# Patient Record
Sex: FEMALE | Race: WHITE | Employment: FULL TIME | URBAN - METROPOLITAN AREA
[De-identification: names, ages, dates, MRNs, and addresses within clinical notes are randomized per-mention and may not be internally consistent; named-entity substitution may affect disease eponyms.]

---

## 2019-07-11 PROCEDURE — 93010 ELECTROCARDIOGRAM REPORT: CPT | Performed by: INTERNAL MEDICINE

## 2019-07-21 ENCOUNTER — HOSPITAL ENCOUNTER (EMERGENCY)
Facility: HOSPITAL | Age: 29
Discharge: HOME | End: 2019-07-21
Payer: COMMERCIAL

## 2019-07-21 VITALS
BODY MASS INDEX: 21.69 KG/M2 | WEIGHT: 135 LBS | RESPIRATION RATE: 18 BRPM | TEMPERATURE: 98 F | HEIGHT: 66 IN | SYSTOLIC BLOOD PRESSURE: 121 MMHG | DIASTOLIC BLOOD PRESSURE: 78 MMHG | HEART RATE: 58 BPM | OXYGEN SATURATION: 98 %

## 2019-07-21 DIAGNOSIS — H16.9 KERATITIS: Primary | ICD-10-CM

## 2019-07-21 PROCEDURE — 99283 EMERGENCY DEPT VISIT LOW MDM: CPT

## 2019-07-21 RX ORDER — TETRACAINE HYDROCHLORIDE 5 MG/ML
1 SOLUTION OPHTHALMIC ONCE
Status: COMPLETED | OUTPATIENT
Start: 2019-07-21 | End: 2019-07-21

## 2019-07-21 RX ORDER — ACETAMINOPHEN 325 MG/1
650 TABLET ORAL ONCE
Status: COMPLETED | OUTPATIENT
Start: 2019-07-21 | End: 2019-07-21

## 2019-07-21 RX ORDER — CIPROFLOXACIN HYDROCHLORIDE 3.5 MG/ML
2 SOLUTION/ DROPS TOPICAL
Qty: 5 ML | Refills: 0 | Status: SHIPPED | OUTPATIENT
Start: 2019-07-21 | End: 2019-07-28

## 2019-07-21 NOTE — ED PROVIDER NOTES
Patient Seen in: [facility] AND [facility] Emergency Department    History   Patient presents with:   Eye Visual Problem (opthalmic)    Stated Complaint: R eye los off vision & swelling    HPI    53-year-old female, with a  history of endometriosis and asthma, pr kg   LMP 06/30/2019   SpO2 98%   BMI 21.79 kg/m²     Right Eye Chart Acuity: 20/100, Uncorrected  Left Eye Chart Acuity: 20/20, Uncorrected    Physical Exam   Constitutional: She is oriented to person, place, and time.  She appears well-developed and well-n days., Print Script, Disp-5 mL, R-0

## 2019-12-04 ENCOUNTER — OFFICE VISIT (OUTPATIENT)
Dept: OBGYN CLINIC | Facility: CLINIC | Age: 29
End: 2019-12-04
Payer: COMMERCIAL

## 2019-12-04 VITALS
WEIGHT: 145 LBS | SYSTOLIC BLOOD PRESSURE: 123 MMHG | BODY MASS INDEX: 23 KG/M2 | DIASTOLIC BLOOD PRESSURE: 85 MMHG | HEART RATE: 56 BPM

## 2019-12-04 DIAGNOSIS — N92.0 MENORRHAGIA WITH REGULAR CYCLE: ICD-10-CM

## 2019-12-04 DIAGNOSIS — Z30.42 ENCOUNTER FOR MANAGEMENT AND INJECTION OF DEPO-PROVERA: Primary | ICD-10-CM

## 2019-12-04 PROCEDURE — 96372 THER/PROPH/DIAG INJ SC/IM: CPT | Performed by: OBSTETRICS & GYNECOLOGY

## 2019-12-04 RX ORDER — MEDROXYPROGESTERONE ACETATE 150 MG/ML
150 INJECTION, SUSPENSION INTRAMUSCULAR
Status: COMPLETED | OUTPATIENT
Start: 2019-12-04 | End: 2019-12-04

## 2019-12-04 RX ADMIN — MEDROXYPROGESTERONE ACETATE 150 MG: 150 INJECTION, SUSPENSION INTRAMUSCULAR at 11:28:00

## 2019-12-04 NOTE — PROGRESS NOTES
PATIENT SAW KIMBERLY FOR BIRTH CONTROL, PT WAS GIVEN DEPO INJECTION TODAY, PT RECEIVED INJECTION ON LEFT UPPER GLUTEUS, PT TOLERATED INJECTION WELL, PT TO COME BACK ON FEB. 19-MAR.5 PT VERBALIZED UNDERSTANDING.

## 2019-12-04 NOTE — PROGRESS NOTES
HPI:    Patient ID: Joana Marin is a 34year old female. HPI    Review of Systems         No current outpatient medications on file.      Allergies:  Latex                   RASH    Comment:Allergic to adhesive  Red Dye                 RASH, HIVES

## 2019-12-04 NOTE — PROGRESS NOTES
Danyel Silverman is a 34year old female [id number] Patient's last menstrual period was 12/03/2019. Patient presents with:  Consult: pt states she trying to find new birth control method. pt has had iud and different ocp       Last seen 2016 for pregnancy. adhesive  Red Dye                 RASH, HIVES      PHYSICAL EXAM:   /85   Pulse 56   Wt 145 lb (65.8 kg)   LMP 12/03/2019   BMI 23.40 kg/m²   Body mass index is 23.4 kg/m². Constitutional: well developed, well nourished      Assessment & Plan:  1.

## 2020-01-29 ENCOUNTER — TELEPHONE (OUTPATIENT)
Dept: OBGYN CLINIC | Facility: CLINIC | Age: 30
End: 2020-01-29

## 2020-01-29 DIAGNOSIS — N92.6 IRREGULAR BLEEDING: Primary | ICD-10-CM

## 2020-01-29 NOTE — TELEPHONE ENCOUNTER
Routed to Rosario Zaman on call to ask to please review and advise on below in JLK's absence. Okay to wait until Clinton Rodriguez [phone] returns?

## 2020-01-29 NOTE — TELEPHONE ENCOUNTER
Pt reports she received first Depo on 12/4/19 and is having prolonged bleeding. States bleeding is more than spotting. Reports bleeding started on 12/23 and has not stopped. States she changes a tampon about every 4 hours. States it is dark brown in color.

## 2020-01-29 NOTE — TELEPHONE ENCOUNTER
Continue to monitor bleeding. It is not uncommon to have prolonged or irregular bleeding with Depo until the second depo injection is given. Recommend CBC to check blood count in the meantime.

## 2020-02-04 NOTE — TELEPHONE ENCOUNTER
Informed pt that Clinton Rodriguez [phone] agrees with the recs that [address] had given. Informed pt that she needs to be patient with depo provera and that irregular bleeding is common. Informed pt that it should get better with more injections.     Asked pt if she is going to get

## 2020-02-04 NOTE — TELEPHONE ENCOUNTER
Agree with KCB recs. Needs to be patient with depo provera. Irregular bleeding common.   It should get better with more shots

## 2020-02-04 NOTE — TELEPHONE ENCOUNTER
Informed pt that Clinton Rodriguez [phone] stated Premarin 1.25 mg q day x 7 days. Informed pt that it was sent to the pharmacy. Pt stated understanding.

## 2021-03-08 ENCOUNTER — HOSPITAL ENCOUNTER (OUTPATIENT)
Age: 31
Discharge: LEFT BEFORE DISCHARGE | End: 2021-03-08
Payer: COMMERCIAL

## 2022-03-03 ENCOUNTER — HOSPITAL ENCOUNTER (EMERGENCY)
Facility: HOSPITAL | Age: 32
Discharge: HOME | End: 2022-03-03
Attending: EMERGENCY MEDICINE

## 2022-03-03 VITALS
WEIGHT: 160 LBS | TEMPERATURE: 98 F | HEIGHT: 67 IN | RESPIRATION RATE: 22 BRPM | HEART RATE: 75 BPM | BODY MASS INDEX: 25.11 KG/M2 | OXYGEN SATURATION: 100 % | DIASTOLIC BLOOD PRESSURE: 80 MMHG | SYSTOLIC BLOOD PRESSURE: 154 MMHG

## 2022-03-03 DIAGNOSIS — M54.12 CERVICAL RADICULOPATHY: Primary | ICD-10-CM

## 2022-03-03 PROCEDURE — 99283 EMERGENCY DEPT VISIT LOW MDM: CPT

## 2022-03-03 RX ORDER — METHYLPREDNISOLONE 4 MG/1
TABLET ORAL
Qty: 1 EACH | Refills: 0 | Status: SHIPPED | OUTPATIENT
Start: 2022-03-03

## 2022-03-03 RX ORDER — TRAMADOL HYDROCHLORIDE 50 MG/1
50 TABLET ORAL EVERY 6 HOURS PRN
Qty: 10 TABLET | Refills: 0 | Status: SHIPPED | OUTPATIENT
Start: 2022-03-03 | End: 2022-03-10

## 2022-03-03 NOTE — ED INITIAL ASSESSMENT (HPI)
Patient states shes having burning pain in her neck and right shoulder and pain goes down her right arm. Pt states this started 2 days ago. Pt states shes had issues with this before and had epidurals / treatment with no relief. Pt states this has been an ongoing issue for about 12 years.

## 2022-05-18 ENCOUNTER — APPOINTMENT (OUTPATIENT)
Dept: URBAN - METROPOLITAN AREA CLINIC 246 | Age: 32
Setting detail: DERMATOLOGY
End: 2022-05-18

## 2022-05-18 DIAGNOSIS — L21.8 OTHER SEBORRHEIC DERMATITIS: ICD-10-CM

## 2022-05-18 DIAGNOSIS — L71.8 OTHER ROSACEA: ICD-10-CM

## 2022-05-18 PROCEDURE — OTHER MIPS QUALITY: OTHER

## 2022-05-18 PROCEDURE — OTHER PRESCRIPTION: OTHER

## 2022-05-18 PROCEDURE — 99214 OFFICE O/P EST MOD 30 MIN: CPT

## 2022-05-18 PROCEDURE — OTHER COUNSELING: OTHER

## 2022-05-18 RX ORDER — CLOBETASOL PROPIONATE 0.5 MG/ML
SOLUTION TOPICAL
Qty: 50 | Refills: 0 | Status: ERX | COMMUNITY
Start: 2022-05-18

## 2022-05-18 RX ORDER — KETOCONAZOLE 20 MG/ML
SHAMPOO, SUSPENSION TOPICAL
Qty: 120 | Refills: 5 | Status: ERX | COMMUNITY
Start: 2022-05-18

## 2022-05-18 RX ORDER — SODIUM SULFACETAMIDE 100 MG/ML
LIQUID TOPICAL
Qty: 480 | Refills: 0 | Status: ERX | COMMUNITY
Start: 2022-05-18

## 2022-05-18 RX ORDER — DOXYCYCLINE HYCLATE 100 MG/1
CAPSULE, GELATIN COATED ORAL
Qty: 30 | Refills: 0 | Status: ERX | COMMUNITY
Start: 2022-05-18

## 2022-05-18 RX ORDER — IVERMECTIN 10 MG/G
CREAM TOPICAL
Qty: 45 | Refills: 0 | Status: ERX | COMMUNITY
Start: 2022-05-18

## 2022-05-18 RX ORDER — SULFACETAMIDE SODIUM 100 MG/ML
LOTION TOPICAL
Qty: 118 | Refills: 0 | Status: ERX | COMMUNITY
Start: 2022-05-18

## 2022-05-18 ASSESSMENT — LOCATION ZONE DERM
LOCATION ZONE: FACE
LOCATION ZONE: SCALP

## 2022-05-18 ASSESSMENT — LOCATION SIMPLE DESCRIPTION DERM
LOCATION SIMPLE: ANTERIOR SCALP
LOCATION SIMPLE: GLABELLA

## 2022-05-18 ASSESSMENT — LOCATION DETAILED DESCRIPTION DERM
LOCATION DETAILED: MID-FRONTAL SCALP
LOCATION DETAILED: GLABELLA

## 2022-05-18 NOTE — PROCEDURE: MIPS QUALITY
Detail Level: Detailed
Quality 226: Preventive Care And Screening: Tobacco Use: Screening And Cessation Intervention: Patient screened for tobacco use and is an ex/non-smoker
Quality 130: Documentation Of Current Medications In The Medical Record: Current Medications Documented
Quality 431: Preventive Care And Screening: Unhealthy Alcohol Use - Screening: Patient not screened for unhealthy alcohol use using a systematic screening method
Quality 110: Preventive Care And Screening: Influenza Immunization: Influenza Immunization previously received during influenza season

## 2022-05-18 NOTE — HPI: RASH
How Severe Is Your Rash?: moderate
Is This A New Presentation, Or A Follow-Up?: Rash
Additional History: Pt has tried anti dandruff shampoos and conditioners. Pt reports wet hair makes it worse, pt reports coconut oil, tea tree oil, and essential oils. Pt reports some rash on her cheeks and dryness.

## 2023-03-08 ENCOUNTER — APPOINTMENT (OUTPATIENT)
Dept: GENERAL RADIOLOGY | Facility: HOSPITAL | Age: 33
End: 2023-03-08

## 2023-03-08 ENCOUNTER — HOSPITAL ENCOUNTER (EMERGENCY)
Facility: HOSPITAL | Age: 33
Discharge: HOME | End: 2023-03-08
Attending: EMERGENCY MEDICINE

## 2023-03-08 VITALS
OXYGEN SATURATION: 100 % | BODY MASS INDEX: 25.71 KG/M2 | HEIGHT: 66 IN | RESPIRATION RATE: 20 BRPM | SYSTOLIC BLOOD PRESSURE: 134 MMHG | TEMPERATURE: 99 F | WEIGHT: 160 LBS | HEART RATE: 105 BPM | DIASTOLIC BLOOD PRESSURE: 91 MMHG

## 2023-03-08 DIAGNOSIS — N92.1 MENORRHAGIA WITH IRREGULAR CYCLE: ICD-10-CM

## 2023-03-08 DIAGNOSIS — H10.9 CONJUNCTIVITIS, UNSPECIFIED CONJUNCTIVITIS TYPE, UNSPECIFIED LATERALITY: ICD-10-CM

## 2023-03-08 DIAGNOSIS — B34.9 VIRAL SYNDROME: Primary | ICD-10-CM

## 2023-03-08 LAB
FLUAV + FLUBV RNA SPEC NAA+PROBE: NEGATIVE
FLUAV + FLUBV RNA SPEC NAA+PROBE: NEGATIVE
RSV RNA SPEC NAA+PROBE: NEGATIVE
SARS-COV-2 RNA RESP QL NAA+PROBE: NOT DETECTED

## 2023-03-08 PROCEDURE — 99284 EMERGENCY DEPT VISIT MOD MDM: CPT

## 2023-03-08 PROCEDURE — 71045 X-RAY EXAM CHEST 1 VIEW: CPT

## 2023-03-08 PROCEDURE — 0241U SARS-COV-2/FLU A AND B/RSV BY PCR (GENEXPERT): CPT | Performed by: EMERGENCY MEDICINE

## 2023-03-08 RX ORDER — PREDNISONE 20 MG/1
40 TABLET ORAL DAILY
Qty: 10 TABLET | Refills: 0 | Status: SHIPPED | OUTPATIENT
Start: 2023-03-08 | End: 2023-03-13

## 2023-03-08 RX ORDER — ACETAMINOPHEN AND CODEINE PHOSPHATE 300; 30 MG/1; MG/1
1 TABLET ORAL EVERY 6 HOURS PRN
Qty: 10 TABLET | Refills: 0 | Status: SHIPPED | OUTPATIENT
Start: 2023-03-08 | End: 2023-03-13

## 2023-03-08 RX ORDER — POLYMYXIN B SULFATE AND TRIMETHOPRIM 1; 10000 MG/ML; [USP'U]/ML
1 SOLUTION OPHTHALMIC
Qty: 10 ML | Refills: 0 | Status: SHIPPED | OUTPATIENT
Start: 2023-03-08 | End: 2023-03-13

## 2023-03-08 RX ORDER — ONDANSETRON 4 MG/1
4 TABLET, ORALLY DISINTEGRATING ORAL ONCE
Status: COMPLETED | OUTPATIENT
Start: 2023-03-08 | End: 2023-03-08

## 2023-03-08 NOTE — ED INITIAL ASSESSMENT (HPI)
Patient presents to ED with c/o cough, vomiting, congestion, eye discharge, headache x 1.5 weeks. Patient states she  Has tried OTC cough relief without relief. States she has tested herself at home for COVID with negative results. Denies fevers. Patient also states she has had her period for 2 weeks.

## 2023-12-18 ENCOUNTER — HOSPITAL ENCOUNTER (EMERGENCY)
Facility: HOSPITAL | Age: 33
Discharge: HOME | End: 2023-12-18
Attending: EMERGENCY MEDICINE
Payer: COMMERCIAL

## 2023-12-18 VITALS
DIASTOLIC BLOOD PRESSURE: 68 MMHG | RESPIRATION RATE: 18 BRPM | SYSTOLIC BLOOD PRESSURE: 122 MMHG | OXYGEN SATURATION: 99 % | TEMPERATURE: 98 F | HEART RATE: 60 BPM

## 2023-12-18 DIAGNOSIS — K04.7 PERIAPICAL ABSCESS: Primary | ICD-10-CM

## 2023-12-18 DIAGNOSIS — K02.9 DENTAL CARIES: ICD-10-CM

## 2023-12-18 PROCEDURE — 99283 EMERGENCY DEPT VISIT LOW MDM: CPT

## 2023-12-18 PROCEDURE — 99284 EMERGENCY DEPT VISIT MOD MDM: CPT

## 2023-12-18 RX ORDER — AMOXICILLIN AND CLAVULANATE POTASSIUM 875; 125 MG/1; MG/1
875 TABLET, FILM COATED ORAL ONCE
Status: COMPLETED | OUTPATIENT
Start: 2023-12-18 | End: 2023-12-18

## 2023-12-18 RX ORDER — AMOXICILLIN AND CLAVULANATE POTASSIUM 875; 125 MG/1; MG/1
1 TABLET, FILM COATED ORAL 2 TIMES DAILY
Qty: 20 TABLET | Refills: 0 | Status: SHIPPED | OUTPATIENT
Start: 2023-12-18 | End: 2023-12-28

## 2023-12-18 RX ORDER — IBUPROFEN 600 MG/1
600 TABLET ORAL ONCE
Status: COMPLETED | OUTPATIENT
Start: 2023-12-18 | End: 2023-12-18

## 2023-12-18 RX ORDER — HYDROCODONE BITARTRATE AND ACETAMINOPHEN 5; 325 MG/1; MG/1
1 TABLET ORAL EVERY 6 HOURS PRN
Qty: 10 TABLET | Refills: 0 | Status: SHIPPED | OUTPATIENT
Start: 2023-12-18

## 2023-12-18 NOTE — ED INITIAL ASSESSMENT (HPI)
Patient arrives to ER for evaluation of Dental problem, facial swelling x 3 day. Patient has needed a root canal for 2 months now.  Unable to find a dentist.

## 2023-12-18 NOTE — CM/SW NOTE
Spoke with patient regarding her need for a dental follow up post discharge from ER. As [facility] was giving patient low cost dental resources, patient stated that she has dental insurance but her dentist is charging her $8,000.00 and she can't go to him anymore. [facility] informed her that most of our dental resources are for [employer] and she lives in [place]. SAYDA instructed her to call one of the resources at the [facility] dental school. Patient v/u.

## 2025-06-29 ENCOUNTER — HOSPITAL ENCOUNTER (EMERGENCY)
Facility: HOSPITAL | Age: 35
Discharge: HOME | End: 2025-06-29
Attending: EMERGENCY MEDICINE
Payer: COMMERCIAL

## 2025-06-29 ENCOUNTER — APPOINTMENT (OUTPATIENT)
Dept: GENERAL RADIOLOGY | Facility: HOSPITAL | Age: 35
End: 2025-06-29
Attending: EMERGENCY MEDICINE
Payer: COMMERCIAL

## 2025-06-29 VITALS
HEART RATE: 77 BPM | OXYGEN SATURATION: 100 % | DIASTOLIC BLOOD PRESSURE: 74 MMHG | TEMPERATURE: 98 F | BODY MASS INDEX: 28.25 KG/M2 | WEIGHT: 180 LBS | SYSTOLIC BLOOD PRESSURE: 119 MMHG | HEIGHT: 67 IN | RESPIRATION RATE: 17 BRPM

## 2025-06-29 DIAGNOSIS — S83.91XA SPRAIN OF RIGHT KNEE, UNSPECIFIED LIGAMENT, INITIAL ENCOUNTER: Primary | ICD-10-CM

## 2025-06-29 PROCEDURE — 73560 X-RAY EXAM OF KNEE 1 OR 2: CPT | Performed by: EMERGENCY MEDICINE

## 2025-06-29 PROCEDURE — 99284 EMERGENCY DEPT VISIT MOD MDM: CPT

## 2025-06-29 RX ORDER — IBUPROFEN 600 MG/1
600 TABLET, FILM COATED ORAL EVERY 8 HOURS PRN
Qty: 30 TABLET | Refills: 0 | Status: SHIPPED | OUTPATIENT
Start: 2025-06-29

## 2025-06-29 NOTE — ED INITIAL ASSESSMENT (HPI)
Pt states she was playing softball on Friday when she heard a pop while running. No deformity seen, non pitting edema proximal R knee, +pedal pulses

## 2025-06-29 NOTE — ED PROVIDER NOTES
Patient Seen in: [facility] Emergency Department       The following individual(s) verbally consented to be recorded using ambient AI listening technology and understand that they can each withdraw their consent to this listening technology at any point by asking the clinician to turn off or pause the recording:    Patient name: Letty Morin        History  Chief Complaint   Patient presents with    Knee Pain     Stated Complaint: R knee injury    Subjective:   HPI     Letty Morin is a 35 year old female who presents with knee pain and swelling after a softball injury.    She experienced a 'pop' and a 'fire feeling' in her knee while playing softball on Friday. There was no fall, but the knee has been swelling since the incident.     The knee sometimes gives out, causing her to collapse forward. She has been taking ibuprofen, two tablets at a time, three times a day since the injury. Elevating her leg with a pillow sometimes helps, but the knee can lock up, making it difficult to get up.    Pain radiates down the side of her leg extending to her ankle. No pain in the ankle itself. The knee feels 'loose'.    She has a history of a previous knee injury on the opposite side, which required laparoscopic surgery about four to five years ago.     She attended a concert yesterday, which involved a lot of standing, and noticed significant swelling in her knee.         Objective:     Past Medical History:    Asthma (HCC)    Endometriosis    Extrinsic asthma, unspecified    no inhaler      Murmur    echo done benign               Past Surgical History:   Procedure Laterality Date      2010    x2     D & c  2014    incomplete ab    Laparoscopy,pelvic,biopsy  2015    dx with endometriosis    Other surgical history      right foot heel spur    Other surgical history  2018    IUD perforation                Social History     Socioeconomic History    Marital status: Single   Tobacco Use    Smoking  status: Never    Smokeless tobacco: Never   Vaping Use    Vaping status: Never Used   Substance and Sexual Activity    Alcohol use: Yes     Alcohol/week: 0.8 standard drinks of alcohol     Types: 1 Standard drinks or equivalent per week     Comment: social    Drug use: No                                Physical Exam    ED Triage Vitals [06/29/25 1311]   /64   Pulse 71   Resp 18   Temp 98.1 °F (36.7 °C)   Temp src Oral   SpO2 96 %   O2 Device None (Room air)       Current Vitals:   Vital Signs  BP: 119/74  Pulse: 77  Resp: 17  Temp: 98.1 °F (36.7 °C)  Temp src: Oral  MAP (mmHg): 84    Oxygen Therapy  SpO2: 100 %  O2 Device: None (Room air)            Physical Exam  Vitals and nursing note reviewed.   Constitutional:       General: She is not in acute distress.     Appearance: Normal appearance. She is not ill-appearing or toxic-appearing.   HENT:      Head: Normocephalic and atraumatic.   Eyes:      Conjunctiva/sclera: Conjunctivae normal.   Cardiovascular:      Rate and Rhythm: Normal rate and regular rhythm.      Pulses:           Dorsalis pedis pulses are 2+ on the right side and 2+ on the left side.   Pulmonary:      Effort: Pulmonary effort is normal. No respiratory distress.   Musculoskeletal:         General: Normal range of motion.      Cervical back: Normal range of motion. No rigidity.      Comments: Able to lift right leg off the bed with knee extended, normal range of motion, noted to have mild edema in right knee, no erythema or warmth   Neurological:      General: No focal deficit present.      Mental Status: She is alert.   Psychiatric:         Mood and Affect: Mood normal.                 ED Course  Labs Reviewed - No data to display                         MDM             Medical Decision Making  Differential diagnosis includes but is not limited to sprain, strain, meniscal injury, fracture, less likely dislocation with spontaneous reduction    Well-appearing patient, history and exam most  consistent with sprain, discussed this with the patient, normal neurovascular, low suspicion for vascular injury.  Discussed plan for knee immobilizer, crutches, supportive care and outpatient orthopedic surgery as well as return precautions.  Patient verbalized understanding of and agreement with this plan.    Problems Addressed:  Sprain of right knee, unspecified ligament, initial encounter: acute illness or injury    Amount and/or Complexity of Data Reviewed  Radiology: ordered and independent interpretation performed.     Details: X-ray image reviewed, no obvious fracture, other and incidental findings deferred to radiology    Risk  Prescription drug management.        Disposition and Plan     Clinical Impression:  1. Sprain of right knee, unspecified ligament, initial encounter         Disposition:  Discharge  6/29/2025  3:05 pm    Follow-up:  Behery, Omar Atef, MD  [address] [place] [id number] [phone]    Schedule an appointment as soon as possible for a visit  For follow up          Medications Prescribed:  Current Discharge Medication List        START taking these medications    Details   ibuprofen 600 MG Oral Tab Take 1 tablet (600 mg total) by mouth every 8 (eight) hours as needed for Pain or Fever.  Qty: 30 tablet, Refills: 0                   Supplementary Documentation:

## 2025-06-29 NOTE — DISCHARGE INSTRUCTIONS
Keep your leg elevated as much as possible.    Take ibuprofen 600 mg every 8 hours for the next few days and then as needed for pain thereafter.      Ice your knee for 15-20 minutes several times throughout the day.    Use the knee immobilizer and crutches while you have pain.    See orthopedic surgery if not improving in the next 5-7 days.

## (undated) NOTE — LETTER
Date & Time: 7/21/2019, 1:50 PM  Patient: Faiza Plata  Encounter Provider(s):    MAIKEL Jolly       To Whom It May Concern:    Jg Cornejo was seen and treated in our department on 7/21/2019.  She may return to work on 7/22/19 wi

## (undated) NOTE — ED AVS SNAPSHOT
Briana Gayle   MRN: [id number]    Department:  [facility] Emergency Department   Date of Visit:  7/21/2019           Disclosure     Insurance plans vary and the physician(s) referred by the ER may not be covered by your plan.  Please contac CARE PHYSICIAN AT ONCE OR RETURN IMMEDIATELY TO THE EMERGENCY DEPARTMENT. If you have been prescribed any medication(s), please fill your prescription right away and begin taking the medication(s) as directed.   If you believe that any of the medications